# Patient Record
Sex: FEMALE | Race: BLACK OR AFRICAN AMERICAN | URBAN - METROPOLITAN AREA
[De-identification: names, ages, dates, MRNs, and addresses within clinical notes are randomized per-mention and may not be internally consistent; named-entity substitution may affect disease eponyms.]

---

## 2017-06-16 ENCOUNTER — OUTPATIENT (OUTPATIENT)
Dept: OUTPATIENT SERVICES | Facility: HOSPITAL | Age: 57
LOS: 1 days | Discharge: HOME | End: 2017-06-16

## 2017-06-28 DIAGNOSIS — Z12.31 ENCOUNTER FOR SCREENING MAMMOGRAM FOR MALIGNANT NEOPLASM OF BREAST: ICD-10-CM

## 2020-04-26 ENCOUNTER — MESSAGE (OUTPATIENT)
Age: 60
End: 2020-04-26

## 2020-05-08 LAB
SARS-COV-2 IGG SERPL IA-ACNC: <0.1 INDEX
SARS-COV-2 IGG SERPL QL IA: NEGATIVE

## 2020-08-31 ENCOUNTER — OUTPATIENT (OUTPATIENT)
Dept: OUTPATIENT SERVICES | Facility: HOSPITAL | Age: 60
LOS: 1 days | Discharge: HOME | End: 2020-08-31
Payer: COMMERCIAL

## 2020-08-31 ENCOUNTER — TRANSCRIPTION ENCOUNTER (OUTPATIENT)
Age: 60
End: 2020-08-31

## 2020-08-31 ENCOUNTER — RESULT REVIEW (OUTPATIENT)
Age: 60
End: 2020-08-31

## 2020-08-31 VITALS
OXYGEN SATURATION: 100 % | TEMPERATURE: 98 F | HEART RATE: 50 BPM | SYSTOLIC BLOOD PRESSURE: 127 MMHG | DIASTOLIC BLOOD PRESSURE: 58 MMHG | RESPIRATION RATE: 18 BRPM

## 2020-08-31 VITALS
HEART RATE: 66 BPM | RESPIRATION RATE: 18 BRPM | SYSTOLIC BLOOD PRESSURE: 157 MMHG | DIASTOLIC BLOOD PRESSURE: 93 MMHG | TEMPERATURE: 99 F | WEIGHT: 179.9 LBS | HEIGHT: 64 IN

## 2020-08-31 PROCEDURE — 88305 TISSUE EXAM BY PATHOLOGIST: CPT | Mod: 26

## 2020-08-31 NOTE — CHART NOTE - NSCHARTNOTEFT_GEN_A_CORE
PACU ANESTHESIA ADMISSION NOTE      Procedure:   Post op diagnosis:      ____  Intubated  TV:______       Rate: ______      FiO2: ______    __x__  Patent Airway    __x__  Full return of protective reflexes    _x___  Full recovery from anesthesia / back to baseline     Vitals:   T:           R: 11                 BP: 112/74                 Sat: 100                  P: 67      Mental Status:  x____ Awake   _x____ Alert   _____ Drowsy   _____ Sedated    Nausea/Vomiting:  __x__ NO  ______Yes,   See Post - Op Orders          Pain Scale (0-10):  __x___    Treatment: ____ None    ____ See Post - Op/PCA Orders    Post - Operative Fluids:   __x__ Oral   ____ See Post - Op Orders    Plan: Discharge:   x____Home       _____Floor     _____Critical Care    _____  Other:_________________    Comments: tolerated procedure well

## 2020-08-31 NOTE — ASU PATIENT PROFILE, ADULT - ATTEMPT TO OOB
Pt is a 46yoF with PMH of migraine?,  benign meningioma s/p craniotomy/ resection in 2015, followed by gamma knife radiation in 2016, now in remission comes to ED with headache and left arm weakness. Pt at baseline has right sided ptosis, reduced sensation of R face?.   History dates back to last night, when pt started having left sided temporal and occipital headache, constant, radiating to neck. Pain was gradual in onset, 9/10 during its peak, not relieved by advil. Pt then started experiencing left arm pain with pins and needles sensations with left arm weakness. Denies head trauma/LOC, vision changes, facial droop, speech changes, chest pain, shortness of breath, abd pain, n/v, gait difficulty, rashes, recent travel or illness. Pt was referred here by her PMD-  was a stroke code on arrival, no tpa given, NIHSS 3 on arrival.   admission labs were unremarkable, CT head and perfusion study were normal.   Seen by neurology on arrival. no

## 2020-09-03 DIAGNOSIS — K64.8 OTHER HEMORRHOIDS: ICD-10-CM

## 2020-09-03 DIAGNOSIS — Z12.11 ENCOUNTER FOR SCREENING FOR MALIGNANT NEOPLASM OF COLON: ICD-10-CM

## 2020-09-03 DIAGNOSIS — K63.5 POLYP OF COLON: ICD-10-CM

## 2020-09-03 DIAGNOSIS — Z91.041 RADIOGRAPHIC DYE ALLERGY STATUS: ICD-10-CM

## 2021-03-25 ENCOUNTER — OUTPATIENT (OUTPATIENT)
Dept: OUTPATIENT SERVICES | Facility: HOSPITAL | Age: 61
LOS: 1 days | Discharge: HOME | End: 2021-03-25

## 2021-03-25 DIAGNOSIS — Z00.8 ENCOUNTER FOR OTHER GENERAL EXAMINATION: ICD-10-CM

## 2021-03-25 PROBLEM — Z78.9 OTHER SPECIFIED HEALTH STATUS: Chronic | Status: ACTIVE | Noted: 2020-08-31

## 2021-07-21 ENCOUNTER — OUTPATIENT (OUTPATIENT)
Dept: OUTPATIENT SERVICES | Facility: HOSPITAL | Age: 61
LOS: 1 days | Discharge: HOME | End: 2021-07-21
Payer: COMMERCIAL

## 2021-07-21 DIAGNOSIS — D25.9 LEIOMYOMA OF UTERUS, UNSPECIFIED: ICD-10-CM

## 2021-07-21 DIAGNOSIS — R92.8 OTHER ABNORMAL AND INCONCLUSIVE FINDINGS ON DIAGNOSTIC IMAGING OF BREAST: ICD-10-CM

## 2021-07-21 PROCEDURE — 76830 TRANSVAGINAL US NON-OB: CPT | Mod: 26

## 2021-07-21 PROCEDURE — 77066 DX MAMMO INCL CAD BI: CPT | Mod: 26

## 2021-07-21 PROCEDURE — 76856 US EXAM PELVIC COMPLETE: CPT | Mod: 26

## 2021-07-21 PROCEDURE — 76641 ULTRASOUND BREAST COMPLETE: CPT | Mod: 26,50

## 2021-07-21 PROCEDURE — G0279: CPT | Mod: 26

## 2021-08-06 PROBLEM — Z00.00 ENCOUNTER FOR PREVENTIVE HEALTH EXAMINATION: Status: ACTIVE | Noted: 2021-08-06

## 2021-08-10 ENCOUNTER — RESULT REVIEW (OUTPATIENT)
Age: 61
End: 2021-08-10

## 2021-08-10 ENCOUNTER — OUTPATIENT (OUTPATIENT)
Dept: OUTPATIENT SERVICES | Facility: HOSPITAL | Age: 61
LOS: 1 days | Discharge: HOME | End: 2021-08-10
Payer: COMMERCIAL

## 2021-08-10 PROCEDURE — 19083 BX BREAST 1ST LESION US IMAG: CPT | Mod: RT

## 2021-08-10 PROCEDURE — 88305 TISSUE EXAM BY PATHOLOGIST: CPT | Mod: 26

## 2021-08-10 PROCEDURE — 77065 DX MAMMO INCL CAD UNI: CPT | Mod: 26,RT

## 2021-08-10 PROCEDURE — 19084 BX BREAST ADD LESION US IMAG: CPT | Mod: RT

## 2021-08-11 LAB — SURGICAL PATHOLOGY STUDY: SIGNIFICANT CHANGE UP

## 2021-08-13 DIAGNOSIS — N63.10 UNSPECIFIED LUMP IN THE RIGHT BREAST, UNSPECIFIED QUADRANT: ICD-10-CM

## 2021-11-19 ENCOUNTER — EMERGENCY (EMERGENCY)
Facility: HOSPITAL | Age: 61
LOS: 0 days | Discharge: HOME | End: 2021-11-19
Attending: EMERGENCY MEDICINE | Admitting: EMERGENCY MEDICINE
Payer: COMMERCIAL

## 2021-11-19 VITALS
OXYGEN SATURATION: 99 % | SYSTOLIC BLOOD PRESSURE: 170 MMHG | HEART RATE: 79 BPM | RESPIRATION RATE: 18 BRPM | WEIGHT: 164.91 LBS | DIASTOLIC BLOOD PRESSURE: 83 MMHG | HEIGHT: 64 IN | TEMPERATURE: 97 F

## 2021-11-19 DIAGNOSIS — M54.2 CERVICALGIA: ICD-10-CM

## 2021-11-19 DIAGNOSIS — S16.1XXA STRAIN OF MUSCLE, FASCIA AND TENDON AT NECK LEVEL, INITIAL ENCOUNTER: ICD-10-CM
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
## 2021-11-19 DIAGNOSIS — Z91.013 ALLERGY TO SEAFOOD: ICD-10-CM
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
## 2021-11-19 DIAGNOSIS — Z91.041 RADIOGRAPHIC DYE ALLERGY STATUS: ICD-10-CM

## 2021-11-19 DIAGNOSIS — Z91.010 ALLERGY TO PEANUTS: ICD-10-CM

## 2021-11-19 PROCEDURE — 99283 EMERGENCY DEPT VISIT LOW MDM: CPT

## 2021-11-19 RX ORDER — IBUPROFEN 200 MG
400 TABLET ORAL ONCE
Refills: 0 | Status: COMPLETED | OUTPATIENT
Start: 2021-11-19 | End: 2021-11-19

## 2021-11-19 RX ORDER — METHOCARBAMOL 500 MG/1
2 TABLET, FILM COATED ORAL
Qty: 42 | Refills: 0
Start: 2021-11-19 | End: 2021-11-25

## 2021-11-19 RX ADMIN — Medication 400 MILLIGRAM(S): at 13:49

## 2021-11-19 NOTE — ED ADULT NURSE NOTE - NSIMPLEMENTINTERV_GEN_ALL_ED
Implemented All Universal Safety Interventions:  [place] to call system. Call bell, personal items and telephone within reach. Instruct patient to call for assistance. Room bathroom lighting operational. Non-slip footwear when patient is off stretcher. Physically safe environment: no spills, clutter or unnecessary equipment. Stretcher in lowest position, wheels locked, appropriate side rails in place.

## 2021-11-19 NOTE — ED PROVIDER NOTE - PHYSICAL EXAMINATION
Gen: Alert, NAD, well appearing  Head: NC, AT, PERRL, EOMI, normal lids/conjunctiva  ENT: normal hearing  Neck: +supple, + tenderness and spasm to left paraspinal muscles of neck. No midline tenderness. Pain with FROM  Pulm: Bilateral BS, normal resp effort, no wheeze/stridor/retractions  CV: RRR  Abd: soft, NT/ND  Mskel: no edema/erythema/cyanosis  Skin: no rash, warm/dry  Neuro: AAOx3, no sensory/motor deficits
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
## 2021-11-19 NOTE — ED PROVIDER NOTE - NS ED ROS FT
Review of Systems    Constitutional: (-) fever, (-) chills  Eyes/ENT: (-) blurry vision, (-) epistaxis, (-) sore throat  Cardiovascular: (-) chest pain, (-) syncope  Respiratory: (-) cough, (-) shortness of breath  Gastrointestinal: (-) pain, (-) nausea, (-) vomiting, (-) diarrhea  Musculoskeletal: (+) neck pain, (-) back pain, (-) body aches  Integumentary: (-) rash, (-) edema  Neurological: (-) headache, (-) altered mental status

## 2021-11-19 NOTE — ED PROVIDER NOTE - PATIENT PORTAL LINK FT
You can access the FollowMyHealth Patient Portal offered by [facility] by registering at the following website: [email or web address]. By joining [employer]’s FollowMyHealth portal, you will also be able to view your health information using other applications (apps) compatible with our system.
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
## 2021-12-13 ENCOUNTER — OUTPATIENT (OUTPATIENT)
Dept: OUTPATIENT SERVICES | Facility: HOSPITAL | Age: 61
LOS: 1 days | Discharge: HOME | End: 2021-12-13
Payer: COMMERCIAL

## 2021-12-13 DIAGNOSIS — N83.292 OTHER OVARIAN CYST, LEFT SIDE: ICD-10-CM

## 2021-12-13 PROCEDURE — 76830 TRANSVAGINAL US NON-OB: CPT | Mod: 26

## 2022-12-23 ENCOUNTER — OUTPATIENT (OUTPATIENT)
Dept: OUTPATIENT SERVICES | Facility: HOSPITAL | Age: 62
LOS: 1 days | Discharge: HOME | End: 2022-12-23
Payer: COMMERCIAL

## 2022-12-23 DIAGNOSIS — Z12.31 ENCOUNTER FOR SCREENING MAMMOGRAM FOR MALIGNANT NEOPLASM OF BREAST: ICD-10-CM

## 2022-12-23 DIAGNOSIS — R07.9 CHEST PAIN, UNSPECIFIED: ICD-10-CM

## 2022-12-23 PROCEDURE — 77067 SCR MAMMO BI INCL CAD: CPT | Mod: 26

## 2022-12-23 PROCEDURE — 71046 X-RAY EXAM CHEST 2 VIEWS: CPT | Mod: 26

## 2022-12-23 PROCEDURE — 77063 BREAST TOMOSYNTHESIS BI: CPT | Mod: 26

## 2023-09-19 ENCOUNTER — EMERGENCY (EMERGENCY)
Facility: HOSPITAL | Age: 63
LOS: 0 days | Discharge: HOME | End: 2023-09-19
Attending: EMERGENCY MEDICINE
Payer: COMMERCIAL

## 2023-09-19 VITALS
TEMPERATURE: 98 F | SYSTOLIC BLOOD PRESSURE: 200 MMHG | OXYGEN SATURATION: 99 % | DIASTOLIC BLOOD PRESSURE: 100 MMHG | HEART RATE: 67 BPM | RESPIRATION RATE: 18 BRPM

## 2023-09-19 VITALS — DIASTOLIC BLOOD PRESSURE: 94 MMHG | SYSTOLIC BLOOD PRESSURE: 174 MMHG

## 2023-09-19 DIAGNOSIS — Z91.013 ALLERGY TO SEAFOOD: ICD-10-CM
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
## 2023-09-19 DIAGNOSIS — S20.20XA CONTUSION OF THORAX, UNSPECIFIED, INITIAL ENCOUNTER: ICD-10-CM

## 2023-09-19 DIAGNOSIS — Z91.041 RADIOGRAPHIC DYE ALLERGY STATUS: ICD-10-CM

## 2023-09-19 DIAGNOSIS — Z91.010 ALLERGY TO PEANUTS: ICD-10-CM

## 2023-09-19 DIAGNOSIS — R07.81 PLEURODYNIA: ICD-10-CM

## 2023-09-19 PROCEDURE — 99284 EMERGENCY DEPT VISIT MOD MDM: CPT

## 2023-09-19 PROCEDURE — 71101 X-RAY EXAM UNILAT RIBS/CHEST: CPT | Mod: 26,LT

## 2023-09-19 PROCEDURE — 71101 X-RAY EXAM UNILAT RIBS/CHEST: CPT | Mod: LT

## 2023-09-19 PROCEDURE — 99283 EMERGENCY DEPT VISIT LOW MDM: CPT | Mod: 25

## 2023-09-19 RX ORDER — LIDOCAINE 4 G/100G
1 CREAM TOPICAL
Qty: 8 | Refills: 0
Start: 2023-09-19 | End: 2023-09-22

## 2023-09-19 RX ORDER — IBUPROFEN 200 MG
600 TABLET ORAL ONCE
Refills: 0 | Status: COMPLETED | OUTPATIENT
Start: 2023-09-19 | End: 2023-09-19

## 2023-09-19 RX ORDER — LIDOCAINE 4 G/100G
1 CREAM TOPICAL ONCE
Refills: 0 | Status: COMPLETED | OUTPATIENT
Start: 2023-09-19 | End: 2023-09-19

## 2023-09-19 RX ADMIN — Medication 600 MILLIGRAM(S): at 20:27

## 2023-09-19 RX ADMIN — LIDOCAINE 1 PATCH: 4 CREAM TOPICAL at 20:28

## 2023-09-19 NOTE — ED PROVIDER NOTE - PATIENT PORTAL LINK FT
You can access the FollowMyHealth Patient Portal offered by [facility] by registering at the following website: [email or web address]. By joining [employer]’s FollowMyHealth portal, you will also be able to view your health information using other applications (apps) compatible with our system.

## 2023-09-19 NOTE — ED ADULT TRIAGE NOTE - MODE OF ARRIVAL
Problem: Depressed Mood (Adult/Pediatric)  Goal: *STG: Participates in treatment plan  Outcome: Progressing Towards Goal     Shift change report given to Shannon ROMERO (oncoming nurse) by Tres Quintanilla (offgoing nurse). Report included the following information SBAR, Kardex, MAR, and Recent Results. Assumed care of patient. Met patient in room. Patient cooperative with assessment, compliant with VS. Patient presented with a flat affect and sad mood. Patient endorsed anxiety. Denied depression, SI, HI and AVH. No complaints of pain. PRN Atarax given at 0850. Patient refused scheduled AM vitamins, only wanted scheduled suboxone. Patient meal compliant. Patient to talk to  in room. Patient heard yelling from room. Patient upset. Patient states that Atarax given was not effective, requesting another anxiety medication. MD notified. Patient up in room coloring. No issues noted throughout shift. Covid test performed on patient. Walk in [employer]

## 2023-09-19 NOTE — ED PROVIDER NOTE - NS ED ATTENDING STATEMENT MOD
This was a shared visit with the PANTERA. I reviewed and verified the documentation and independently performed the documented:

## 2023-09-19 NOTE — ED PROVIDER NOTE - CLINICAL SUMMARY MEDICAL DECISION MAKING FREE TEXT BOX
X-ray obtained.  Interpreted by me.  No pneumothorax.  I do not see evidence of acute fracture.  Results discussed with patient.  Patient states that lidocaine patch did help.  Patient to continue Motrin as needed as well as lidocaine patch.  Patient to follow-up PMD return if any worsening symptoms or concerns

## 2023-09-19 NOTE — ED ADULT NURSE NOTE - PAIN: PRESENCE, MLM
[FreeTextEntry1] : 61 year old female with left convexity meningioma. Remains asymptomatic. MRI of brain shows no change in size of meningioma. Followup MRI brain with and without contrast in one year.  complains of pain/discomfort
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
## 2023-09-19 NOTE — ED ADULT NURSE NOTE - NSFALLUNIVINTERV_ED_ALL_ED
Bed/Stretcher in lowest position, wheels locked, appropriate side rails in place/Call bell, personal items and telephone in reach/Instruct patient to call for assistance before getting out of bed/chair/stretcher/Non-slip footwear applied when patient is off stretcher/[place] to call system/Physically safe environment - no spills, clutter or unnecessary equipment/Purposeful proactive rounding/Room/bathroom lighting operational, light cord in reach

## 2023-09-19 NOTE — ED PROVIDER NOTE - PHYSICAL EXAMINATION
Vital Signs: I have reviewed the initial vital signs.  Constitutional: well-nourished, no acute distress, normocephalic  Cardiovascular: regular rate, regular rhythm, no murmur appreciated  Respiratory: unlabored respiratory effort, clear to auscultation bilaterally, +ttp left lower chest wall to palpation midaxillary line no crepitation   Gastrointestinal: soft, non-tender, non-distended  abdomen, no pulsatile mass  Musculoskeletal: supple neck, gait steady, no bony tenderness  Integumentary: warm, dry, no rash  Neurologic: awake, alert, cranial nerves II-XII grossly intact, extremities’ motor and sensory functions grossly intact, no focal deficits, GCS 15
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
## 2024-08-01 ENCOUNTER — EMERGENCY (EMERGENCY)
Facility: HOSPITAL | Age: 64
LOS: 0 days | Discharge: HOME | End: 2024-08-01
Attending: EMERGENCY MEDICINE
Payer: COMMERCIAL

## 2024-08-01 VITALS
DIASTOLIC BLOOD PRESSURE: 81 MMHG | RESPIRATION RATE: 18 BRPM | SYSTOLIC BLOOD PRESSURE: 158 MMHG | OXYGEN SATURATION: 98 % | WEIGHT: 175.05 LBS | HEART RATE: 89 BPM | TEMPERATURE: 98 F

## 2024-08-01 DIAGNOSIS — M25.561 PAIN IN RIGHT KNEE: ICD-10-CM

## 2024-08-01 DIAGNOSIS — Z91.010 ALLERGY TO PEANUTS: ICD-10-CM

## 2024-08-01 DIAGNOSIS — I10 ESSENTIAL (PRIMARY) HYPERTENSION: ICD-10-CM

## 2024-08-01 DIAGNOSIS — Z91.041 RADIOGRAPHIC DYE ALLERGY STATUS: ICD-10-CM

## 2024-08-01 DIAGNOSIS — G89.29 OTHER CHRONIC PAIN: ICD-10-CM

## 2024-08-01 DIAGNOSIS — Z91.013 ALLERGY TO SEAFOOD: ICD-10-CM

## 2024-08-01 DIAGNOSIS — M25.461 EFFUSION, RIGHT KNEE: ICD-10-CM

## 2024-08-01 PROCEDURE — 73562 X-RAY EXAM OF KNEE 3: CPT | Mod: 26,RT

## 2024-08-01 PROCEDURE — 73562 X-RAY EXAM OF KNEE 3: CPT | Mod: RT

## 2024-08-01 PROCEDURE — 99284 EMERGENCY DEPT VISIT MOD MDM: CPT

## 2024-08-01 PROCEDURE — 96372 THER/PROPH/DIAG INJ SC/IM: CPT

## 2024-08-01 PROCEDURE — 99283 EMERGENCY DEPT VISIT LOW MDM: CPT | Mod: 25

## 2024-08-01 RX ORDER — KETOROLAC TROMETHAMINE 30 MG/ML
30 INJECTION, SOLUTION INTRAMUSCULAR ONCE
Refills: 0 | Status: DISCONTINUED | OUTPATIENT
Start: 2024-08-01 | End: 2024-08-01

## 2024-08-01 RX ADMIN — KETOROLAC TROMETHAMINE 30 MILLIGRAM(S): 30 INJECTION, SOLUTION INTRAMUSCULAR at 08:00

## 2024-08-01 NOTE — ED PROVIDER NOTE - HIV OFFER
Previously Declined (within the last year)
“You can access the FollowHealth Patient Portal, offered by [facility], by registering with the following website: [email or web address]”

## 2024-08-01 NOTE — ED PROVIDER NOTE - OBJECTIVE STATEMENT
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

## 2024-08-01 NOTE — ED PROVIDER NOTE - NSPTACCESSSVCSAPPTDETAILS_ED_ALL_ED_FT
Orders entered. Pt was notified via voice message.   
Patient would like to have a Thyroid Panel order put in so she can have her blood drawn at the [facility] lab.  
fine  
lov- 11/4/16.     Pt requesting \"thyroid panel\" please advise.   
[employer] employee with worsening of chronic knee pain for followup

## 2024-08-01 NOTE — ED PROVIDER NOTE - CLINICAL SUMMARY MEDICAL DECISION MAKING FREE TEXT BOX
Patient with worsening of chronic knee pain, exam as above, imaging appreciated, Ace wrap applied, will discharge with orthopedic follow-up

## 2024-08-01 NOTE — ED PROVIDER NOTE - CARE PROVIDER_API CALL
Milad White  Orthopaedic Surgery  [address] [place] [address]  Phone: [phone]  Fax: [phone]  Follow Up Time:

## 2024-08-01 NOTE — ED ADULT NURSE NOTE - NSFALLUNIVINTERV_ED_ALL_ED
TIA/ISCHEMIC/HEMORRHAGIC STROKE  Transient Ischemic Attack    YOUR STROKE RISK FACTORS INCLUDE:   It is important that you know your risk factors and that you work with your doctor on treatment and lifestyle changes to lower your risk of having a future stroke. Personal Stroke Risks: Non-Modifable: Age over 54 years, Family history  Personal Stroke Risks: Modifiable: High blood pressure, High cholesterol, Physical inactivity, Overweight/obesity    MEDICATIONS:  Â· See Discharge Medication List  Â· Take medications as they are scheduled, and talk with your physician if there are problems taking medications. Â· Keep a list of medications up to date and carry with you at all times. VACCINES:  Most Recent Immunizations   Administered Date(s) Administered   â¢ COVID-19 Moderna Vaccine 03/05/2021   â¢ Influenza, high dose quadrivalent, preservative-free 11/27/2020   â¢ Influenza, high dose seasonal, preservative-free 11/28/2016   â¢ Influenza, injectable, quadrivalent 11/28/2016   â¢ Influenza, injectable, quadrivalent, preservative-free 10/02/2018   â¢ Influenza, seasonal, injectable, trivalent 10/02/2015   â¢ Pneumococcal Conjugate 13 valent 11/28/2016   â¢ Pneumococcal polysaccharide, adult, 23 valent 10/01/2018   â¢ Shingles Zoster (Shingrix) 11/15/2019   â¢ Tdap 06/21/2011   â¢ Zoster Shingles 01/01/2017   â¢ influenza, trivalent, adjuvanted 10/17/2018     Follow up with your doctor regarding influenza and/or pneumonia vaccine(s). ACTIVITY:  Other As tolerated    SMOKING:  Â· Avoid all tobacco products and second hand smoke. Smoking Cessation Counseling offered. [place] Toll Free Quit Line: [phone]    DIET:  General    EDUCATION MATERIALS:  Stroke Folder    B. E.  F.A.S.T. is an easy way to remember the signs of stroke.     Call 9-1-1 if:    BALANCE-Sudden loss of coordination or balance    EYES-Sudden change in vision    FACE-Sudden weakness on one side of the face or facial droop    ARM-Sudden arm or leg weakness or numbness    SPEECH-Sudden slurred speech, trouble speaking, trouble understanding speech    TERRIBLE HEADACHE-Sudden onset of a terrible headache    If someone has any of these symptoms, even if they go away, it is important to call 911 immediately. It is important to make note of the time the symptoms first appeared to receive the best treatment. Bed/Stretcher in lowest position, wheels locked, appropriate side rails in place/Call bell, personal items and telephone in reach/Instruct patient to call for assistance before getting out of bed/chair/stretcher/Non-slip footwear applied when patient is off stretcher/[place] to call system/Physically safe environment - no spills, clutter or unnecessary equipment/Purposeful proactive rounding/Room/bathroom lighting operational, light cord in reach

## 2024-08-01 NOTE — ED PROVIDER NOTE - PHYSICAL EXAMINATION
Vital Signs: I have reviewed the initial vital signs.  Constitutional: well-nourished, appears stated age, no acute distress  Musculoskeletal: FROM x 4 no c/c/e, pulses equal calves nontender, right knee: Patient with small prepatellar effusion with medial joint line tenderness and medial pain on valgus stress, joint stable, extensor mechanism intact, no warmth or erythema, neurovascularly intact distally  Integumentary: warm, dry, no rash  Neurologic: awake, alert, nonfocal

## 2024-08-01 NOTE — ED PROVIDER NOTE - NSFOLLOWUPINSTRUCTIONS_ED_ALL_ED_FT
Chronic Knee Pain, Adult  Knee pain that lasts longer than 3 months is called chronic knee pain. You may have pain in one or both knees. Symptoms of chronic knee pain may also include swelling and stiffness.    Many conditions can cause chronic knee pain. The most common cause is wear and tear of your knee joint as you get older.    Other possible causes include:  A disease that causes inflammation of the knee, such as rheumatoid arthritis. This usually affects both knees.  A condition called inflammatory arthritis, such as gout.  An injury to the knee that causes arthritis.  An injury to the knee that damages the ligaments. Ligaments are tissues that connect bones to each other.  Runner's knee or pain behind the kneecap.  Treatment for chronic knee pain depends on the cause. The main treatments for chronic knee pain are:  Doing exercises to help your knee move better and get stronger, called physical therapy.  Losing weight if you are overweight.  This condition may also be treated with medicines, injections, a knee sleeve or brace, and by using crutches.    You health care provider may also recommend rest, ice, pressure (compression), and elevation, also called RICE therapy.    Follow these instructions at home:  If you have a knee sleeve or brace that can be taken off:    A brace on a person's knee.  Wear the knee sleeve or brace as told by your provider. Take it off only if your provider says that you can.  Check the skin around it every day. Tell your provider if you see problems.  Loosen the knee sleeve or brace if your toes tingle, are numb, or turn cold and blue.  Keep the knee sleeve or brace clean and dry.  Bathing    If the knee sleeve or brace is not waterproof:  Do not let it get wet.  Cover it when you take a bath or shower. Use a cover that does not let any water in.  Managing pain, stiffness, and swelling    A heating pad being used on the painful area.  An ice pack on a painful knee.  If told, put heat on the area. Do this as often as told. Use the heat source that your provider recommends, such as a moist heat pack or a heating pad.  If you have a knee sleeve or brace that you can take off, remove it as told.  Place a towel between your skin and the heat source.  Leave the heat on for 20–30 minutes.  If told, put ice on the area.  If you have a knee sleeve or brace that you can take off, remove it as told.  Put ice in a plastic bag.  Place a towel between your skin and the bag.  Leave the ice on for 20 minutes, 2–3 times a day.  If your skin turns bright red, remove the ice or heat right away to prevent skin damage. The risk of damage is higher if you cannot feel pain, heat, or cold.  Move your toes often to reduce stiffness and swelling.  Raise the injured area above the level of your heart while you are sitting or lying down. Use a pillow to support your foot as needed.  Activity    Avoid activities where both feet leave the ground at the same time. Avoid running, jumping rope, and doing jumping jacks.  Follow the exercise plan that your provider made for you. Your provider may suggest that you:  Avoid activities that make knee pain worse. This may mean that you need to change your exercise routines, sports, or job duties.  Wear shoes with cushioned soles.  Avoid sports that require running and sudden changes in direction.  Do physical therapy. Physical therapy helps your knee move better and get stronger. Exercise as told.  Do exercises that increase balance and strength, such as tamika chi and yoga.  Do not stand or walk on your injured knee until you're told it's okay. Use crutches as told.  Return to normal activities when you're told. Ask what things are safe for you to do.  General instructions    Take your medicines only as told by your provider.  If you are overweight, work with your provider and an expert in healthy eating called a dietitian to set goals to lose weight. Losing even a little weight can reduce knee pain. Being overweight can make your knee hurt more.  Do not smoke, vape, or use products with nicotine or tobacco in them. If you need help quitting, talk with your provider.  Keep all follow-up visits. Your provider will monitor your pain and try other treatments if needed.  Contact a health care provider if:  You have knee pain that is not getting better or gets worse.  You are not able to do your exercises due to knee pain.  Get help right away if:  Your knee swells and the swelling becomes worse.  You cannot move your knee.  You have severe knee pain. Chronic Knee Pain, Adult  Knee pain that lasts longer than 3 months is called chronic knee pain. You may have pain in one or both knees. Symptoms of chronic knee pain may also include swelling and stiffness.    Many conditions can cause chronic knee pain. The most common cause is wear and tear of your knee joint as you get older.    Other possible causes include:  A disease that causes inflammation of the knee, such as rheumatoid arthritis. This usually affects both knees.  A condition called inflammatory arthritis, such as gout.  An injury to the knee that causes arthritis.  An injury to the knee that damages the ligaments. Ligaments are tissues that connect bones to each other.  Runner's knee or pain behind the kneecap.  Treatment for chronic knee pain depends on the cause. The main treatments for chronic knee pain are:  Doing exercises to help your knee move better and get stronger, called physical therapy.  Losing weight if you are overweight.  This condition may also be treated with medicines, injections, a knee sleeve or brace, and by using crutches.    You health care provider may also recommend rest, ice, pressure (compression), and elevation, also called RICE therapy.    Follow these instructions at home:  If you have a knee sleeve or brace that can be taken off:    A brace on a person's knee.  Wear the knee sleeve or brace as told by your provider. Take it off only if your provider says that you can.  Check the skin around it every day. Tell your provider if you see problems.  Loosen the knee sleeve or brace if your toes tingle, are numb, or turn cold and blue.  Keep the knee sleeve or brace clean and dry.  Bathing    If the knee sleeve or brace is not waterproof:  Do not let it get wet.  Cover it when you take a bath or shower. Use a cover that does not let any water in.  Managing pain, stiffness, and swelling    A heating pad being used on the painful area.  An ice pack on a painful knee.  If told, put heat on the area. Do this as often as told. Use the heat source that your provider recommends, such as a moist heat pack or a heating pad.  If you have a knee sleeve or brace that you can take off, remove it as told.  Place a towel between your skin and the heat source.  Leave the heat on for 20–30 minutes.  If told, put ice on the area.  If you have a knee sleeve or brace that you can take off, remove it as told.  Put ice in a plastic bag.  Place a towel between your skin and the bag.  Leave the ice on for 20 minutes, 2–3 times a day.  If your skin turns bright red, remove the ice or heat right away to prevent skin damage. The risk of damage is higher if you cannot feel pain, heat, or cold.  Move your toes often to reduce stiffness and swelling.  Raise the injured area above the level of your heart while you are sitting or lying down. Use a pillow to support your foot as needed.  Activity    Avoid activities where both feet leave the ground at the same time. Avoid running, jumping rope, and doing jumping jacks.  Follow the exercise plan that your provider made for you. Your provider may suggest that you:  Avoid activities that make knee pain worse. This may mean that you need to change your exercise routines, sports, or job duties.  Wear shoes with cushioned soles.  Avoid sports that require running and sudden changes in direction.  Do physical therapy. Physical therapy helps your knee move better and get stronger. Exercise as told.  Do exercises that increase balance and strength, such as tamika chi and yoga.  Do not stand or walk on your injured knee until you're told it's okay. Use crutches as told.  Return to normal activities when you're told. Ask what things are safe for you to do.  General instructions    Take your medicines only as told by your provider.  If you are overweight, work with your provider and an expert in healthy eating called a dietitian to set goals to lose weight. Losing even a little weight can reduce knee pain. Being overweight can make your knee hurt more.  Do not smoke, vape, or use products with nicotine or tobacco in them. If you need help quitting, talk with your provider.  Keep all follow-up visits. Your provider will monitor your pain and try other treatments if needed.  Contact a health care provider if:  You have knee pain that is not getting better or gets worse.  You are not able to do your exercises due to knee pain.  Get help right away if:  Your knee swells and the swelling becomes worse.  You cannot move your knee.  You have severe knee pain.    Our Emergency Department Referral Coordinators will be reaching out to you in the next 24-48 hours from 9:00am to 5:00pm to schedule a follow up appointment. Please expect a phone call from the hospital in that time frame. If you do not receive a call or if you have any questions or concerns, you can reach them at   [phone].

## 2024-08-01 NOTE — ED PROVIDER NOTE - PATIENT PORTAL LINK FT
You can access the FollowMyHealth Patient Portal offered by [facility] by registering at the following website: [email or web address]. By joining [employer]’s FollowMyHealth portal, you will also be able to view your health information using other applications (apps) compatible with our system.

## 2024-08-13 ENCOUNTER — APPOINTMENT (OUTPATIENT)
Dept: ORTHOPEDIC SURGERY | Facility: CLINIC | Age: 64
End: 2024-08-13

## 2025-02-10 NOTE — ED ADULT NURSE NOTE - HOW MANY DRINKS CONTAINING ALCOHOL DO YOU HAVE ON A TYPICAL DAY WHEN YOU ARE DRINKING?
Pt Received post Cath,, Pt complaining of Right wrist pain  Site Checked, Positive pulse, no Bleeding, no Hematoma noted, Cap refill <2 sec  Tylenol ordered for Pain 1 or 2